# Patient Record
Sex: FEMALE | Race: BLACK OR AFRICAN AMERICAN
[De-identification: names, ages, dates, MRNs, and addresses within clinical notes are randomized per-mention and may not be internally consistent; named-entity substitution may affect disease eponyms.]

---

## 2017-06-10 ENCOUNTER — HEALTH MAINTENANCE LETTER (OUTPATIENT)
Age: 62
End: 2017-06-10

## 2018-07-30 ENCOUNTER — TRANSFERRED RECORDS (OUTPATIENT)
Dept: HEALTH INFORMATION MANAGEMENT | Facility: CLINIC | Age: 63
End: 2018-07-30

## 2019-12-09 ENCOUNTER — HEALTH MAINTENANCE LETTER (OUTPATIENT)
Age: 64
End: 2019-12-09

## 2020-03-15 ENCOUNTER — HEALTH MAINTENANCE LETTER (OUTPATIENT)
Age: 65
End: 2020-03-15

## 2021-01-15 ENCOUNTER — HEALTH MAINTENANCE LETTER (OUTPATIENT)
Age: 66
End: 2021-01-15

## 2021-03-14 ENCOUNTER — HEALTH MAINTENANCE LETTER (OUTPATIENT)
Age: 66
End: 2021-03-14

## 2021-10-24 ENCOUNTER — HEALTH MAINTENANCE LETTER (OUTPATIENT)
Age: 66
End: 2021-10-24

## 2022-04-10 ENCOUNTER — HEALTH MAINTENANCE LETTER (OUTPATIENT)
Age: 67
End: 2022-04-10

## 2022-10-15 ENCOUNTER — HEALTH MAINTENANCE LETTER (OUTPATIENT)
Age: 67
End: 2022-10-15

## 2023-06-01 ENCOUNTER — HEALTH MAINTENANCE LETTER (OUTPATIENT)
Age: 68
End: 2023-06-01

## 2024-12-21 ENCOUNTER — HEALTH MAINTENANCE LETTER (OUTPATIENT)
Age: 69
End: 2024-12-21

## 2024-12-23 ENCOUNTER — OFFICE VISIT (OUTPATIENT)
Dept: OPHTHALMOLOGY | Facility: CLINIC | Age: 69
End: 2024-12-23
Payer: COMMERCIAL

## 2024-12-23 DIAGNOSIS — H40.1131 PRIMARY OPEN ANGLE GLAUCOMA (POAG) OF BOTH EYES, MILD STAGE: Primary | ICD-10-CM

## 2024-12-23 RX ORDER — LATANOPROST 50 UG/ML
1 SOLUTION/ DROPS OPHTHALMIC AT BEDTIME
COMMUNITY
End: 2024-12-23

## 2024-12-23 RX ORDER — LATANOPROST 50 UG/ML
1 SOLUTION/ DROPS OPHTHALMIC AT BEDTIME
Qty: 7.5 ML | Refills: 3 | Status: SHIPPED | OUTPATIENT
Start: 2024-12-23

## 2024-12-23 ASSESSMENT — REFRACTION_WEARINGRX
OD_CYLINDER: +1.00
OD_ADD: +2.85
OS_SPHERE: -4.50
OD_SPHERE: -5.25
OS_AXIS: 110
OS_CYLINDER: +0.50
OS_ADD: +2.85
SPECS_TYPE: PAL
OD_AXIS: 014

## 2024-12-23 ASSESSMENT — TONOMETRY
OD_IOP_MMHG: 20
OS_IOP_MMHG: 19
IOP_METHOD: APPLANATION

## 2024-12-23 ASSESSMENT — VISUAL ACUITY
OD_CC: 20/20
CORRECTION_TYPE: GLASSES
OD_CC+: -2
OS_CC+: -1
METHOD: SNELLEN - LINEAR
OS_CC: 20/20

## 2024-12-23 ASSESSMENT — SLIT LAMP EXAM - LIDS
COMMENTS: 1+ DERMATOCHALASIS, 1+ MEIBOMIAN GLAND DYSFUNCTION
COMMENTS: 1+ DERMATOCHALASIS, 1+ MEIBOMIAN GLAND DYSFUNCTION

## 2024-12-23 ASSESSMENT — EXTERNAL EXAM - RIGHT EYE: OD_EXAM: PROLAPSED FAT PADS: UPPER, LOWER

## 2024-12-23 ASSESSMENT — EXTERNAL EXAM - LEFT EYE: OS_EXAM: PROLAPSED FAT PADS: UPPER, LOWER

## 2024-12-23 NOTE — PATIENT INSTRUCTIONS
"Continue:   Latanoprost (green top) every evening both eyes.    May use artificial tears up to four times a day (like Refresh Optive, Systane Balance, or TheraTears. Avoid \"get the red out\" drops and generic artifical tears).     Wait at least 5 minutes between drops if using more than one at a time.     Return visit 6 months for a complete exam and a pachy.    Robin Bejarano M.D.  [phone]    "

## 2024-12-23 NOTE — LETTER
"12/23/2024      Shelby Munoz  [address] [place] [facility] [address]      Dear Colleague,    Thank you for referring your patient, Shelby Munoz, to the [facility]. Please see a copy of my visit note below.     Current Eye Medications:  Latanoprost at bedtime both eyes     Subjective:  Looking for a new doctor as they told her they lost her records at [facility].  She is here for glaucoma follow up per patient as she was diagnosed with glaucoma about three years ago.  Her last complete eye exam was in May of 2024.  Has had laser in both eyes for Pressure release at [facility].  Needs refills sent.    Diabetes     Had presumed selective laser trabeculoplasty both eyes.      Objective:  See Ophthalmology Exam.       Assessment:  Baseline glaucoma testing in patient with abnormal testing right eye and arturo testing left eye.  Intraocular pressure high normal both eyes.      Plan:  Continue:   Latanoprost (green top) every evening both eyes.    May use artificial tears up to four times a day (like Refresh Optive, Systane Balance, or TheraTears. Avoid \"get the red out\" drops and generic artifical tears).     Wait at least 5 minutes between drops if using more than one at a time.     Return visit 6 months for a complete exam and a pachy.    Robin Bejarano M.D.  [phone]            Again, thank you for allowing me to participate in the care of your patient.        Sincerely,        Robin Bejarano MD    Electronically signed"

## 2024-12-23 NOTE — PROGRESS NOTES
" Current Eye Medications:  Latanoprost at bedtime both eyes     Subjective:  Looking for a new doctor as they told her they lost her records at [facility].  She is here for glaucoma follow up per patient as she was diagnosed with glaucoma about three years ago.  Her last complete eye exam was in May of 2024.  Has had laser in both eyes for Pressure release at [facility].  Needs refills sent.    Diabetes     Had presumed selective laser trabeculoplasty both eyes.      Objective:  See Ophthalmology Exam.       Assessment:  Baseline glaucoma testing in patient with abnormal testing right eye and arturo testing left eye.  Intraocular pressure high normal both eyes.      Plan:  Continue:   Latanoprost (green top) every evening both eyes.    May use artificial tears up to four times a day (like Refresh Optive, Systane Balance, or TheraTears. Avoid \"get the red out\" drops and generic artifical tears).     Wait at least 5 minutes between drops if using more than one at a time.     Return visit 6 months for a complete exam and a pachy.    Robin Bejarano M.D.  [phone]        "

## 2024-12-24 PROBLEM — H40.1131 PRIMARY OPEN ANGLE GLAUCOMA (POAG) OF BOTH EYES, MILD STAGE: Status: ACTIVE | Noted: 2024-12-24

## 2025-03-28 PROBLEM — E66.01 CLASS 2 SEVERE OBESITY WITH BODY MASS INDEX (BMI) OF 35 TO 39.9 WITH SERIOUS COMORBIDITY (H): Status: ACTIVE | Noted: 2025-03-28

## 2025-03-28 PROBLEM — E66.812 CLASS 2 SEVERE OBESITY WITH BODY MASS INDEX (BMI) OF 35 TO 39.9 WITH SERIOUS COMORBIDITY (H): Status: ACTIVE | Noted: 2025-03-28

## 2025-03-31 ENCOUNTER — PATIENT OUTREACH (OUTPATIENT)
Dept: CARE COORDINATION | Facility: CLINIC | Age: 70
End: 2025-03-31
Payer: MEDICARE

## 2025-04-01 ENCOUNTER — LAB (OUTPATIENT)
Dept: LAB | Facility: CLINIC | Age: 70
End: 2025-04-01
Payer: COMMERCIAL

## 2025-04-01 DIAGNOSIS — I10 HYPERTENSION GOAL BP (BLOOD PRESSURE) < 140/90: ICD-10-CM

## 2025-04-01 DIAGNOSIS — E11.9 TYPE 2 DIABETES MELLITUS WITHOUT COMPLICATION, WITHOUT LONG-TERM CURRENT USE OF INSULIN (H): ICD-10-CM

## 2025-04-01 DIAGNOSIS — E78.5 HYPERLIPIDEMIA LDL GOAL <160: ICD-10-CM

## 2025-04-01 LAB
ANION GAP SERPL CALCULATED.3IONS-SCNC: 11 MMOL/L (ref 7–15)
BUN SERPL-MCNC: 16.5 MG/DL (ref 8–23)
CALCIUM SERPL-MCNC: 9.8 MG/DL (ref 8.8–10.4)
CHLORIDE SERPL-SCNC: 103 MMOL/L (ref 98–107)
CHOLEST SERPL-MCNC: 201 MG/DL
CREAT SERPL-MCNC: 0.97 MG/DL (ref 0.51–0.95)
CREAT UR-MCNC: 176 MG/DL
EGFRCR SERPLBLD CKD-EPI 2021: 63 ML/MIN/1.73M2
EST. AVERAGE GLUCOSE BLD GHB EST-MCNC: 114 MG/DL
FASTING STATUS PATIENT QL REPORTED: YES
FASTING STATUS PATIENT QL REPORTED: YES
GLUCOSE SERPL-MCNC: 90 MG/DL (ref 70–99)
HBA1C MFR BLD: 5.6 % (ref 0–5.6)
HCO3 SERPL-SCNC: 26 MMOL/L (ref 22–29)
HDLC SERPL-MCNC: 49 MG/DL
LDLC SERPL CALC-MCNC: 129 MG/DL
MICROALBUMIN UR-MCNC: <12 MG/L
MICROALBUMIN/CREAT UR: NORMAL MG/G{CREAT}
NONHDLC SERPL-MCNC: 152 MG/DL
POTASSIUM SERPL-SCNC: 3.5 MMOL/L (ref 3.4–5.3)
SODIUM SERPL-SCNC: 140 MMOL/L (ref 135–145)
TRIGL SERPL-MCNC: 114 MG/DL

## 2025-04-01 PROCEDURE — 80048 BASIC METABOLIC PNL TOTAL CA: CPT

## 2025-04-01 PROCEDURE — 82043 UR ALBUMIN QUANTITATIVE: CPT

## 2025-04-01 PROCEDURE — 80061 LIPID PANEL: CPT

## 2025-04-01 PROCEDURE — 36415 COLL VENOUS BLD VENIPUNCTURE: CPT

## 2025-04-01 PROCEDURE — 82570 ASSAY OF URINE CREATININE: CPT

## 2025-04-01 PROCEDURE — 83036 HEMOGLOBIN GLYCOSYLATED A1C: CPT

## 2025-04-29 ENCOUNTER — TELEPHONE (OUTPATIENT)
Dept: FAMILY MEDICINE | Facility: CLINIC | Age: 70
End: 2025-04-29
Payer: MEDICARE

## 2025-04-29 NOTE — TELEPHONE ENCOUNTER
Patient Quality Outreach    Patient is due for the following:   Diabetes -  Foot Exam      Topic Date Due    COVID-19 Vaccine (8 - 2024-25 season) 03/05/2025       Action(s) Taken:   Schedule a office visit for diabetes    Type of outreach:    Sent [email or web address] message.    Questions for provider review:    None         Libra Jane  Chart routed to .

## 2025-05-16 ENCOUNTER — ANCILLARY PROCEDURE (OUTPATIENT)
Dept: MAMMOGRAPHY | Facility: CLINIC | Age: 70
End: 2025-05-16
Attending: INTERNAL MEDICINE
Payer: MEDICARE

## 2025-05-16 ENCOUNTER — ANCILLARY PROCEDURE (OUTPATIENT)
Dept: BONE DENSITY | Facility: CLINIC | Age: 70
End: 2025-05-16
Attending: INTERNAL MEDICINE
Payer: MEDICARE

## 2025-05-16 ENCOUNTER — RESULTS FOLLOW-UP (OUTPATIENT)
Dept: FAMILY MEDICINE | Facility: CLINIC | Age: 70
End: 2025-05-16

## 2025-05-16 DIAGNOSIS — Z12.31 ENCOUNTER FOR SCREENING MAMMOGRAM FOR BREAST CANCER: ICD-10-CM

## 2025-05-16 DIAGNOSIS — E28.39 ESTROGEN DEFICIENCY: ICD-10-CM

## 2025-05-16 PROCEDURE — 77067 SCR MAMMO BI INCL CAD: CPT | Performed by: RADIOLOGY

## 2025-05-16 PROCEDURE — 77080 DXA BONE DENSITY AXIAL: CPT | Performed by: RADIOLOGY

## 2025-05-16 PROCEDURE — 77063 BREAST TOMOSYNTHESIS BI: CPT | Performed by: RADIOLOGY

## 2025-05-16 PROCEDURE — 77081 DXA BONE DENSITY APPENDICULR: CPT | Mod: XU | Performed by: RADIOLOGY

## 2025-05-19 ENCOUNTER — TELEPHONE (OUTPATIENT)
Dept: OPHTHALMOLOGY | Facility: CLINIC | Age: 70
End: 2025-05-19
Payer: MEDICARE

## 2025-05-19 NOTE — TELEPHONE ENCOUNTER
Tried calling patient back, no answer. It did not give a beep to leave a message either. Will message patient via [employer]. Not positive what she is trying to buy for her eyes.

## 2025-05-19 NOTE — TELEPHONE ENCOUNTER
[facility] Call Center    Phone Message    May a detailed message be left on voicemail: yes     Reason for Call: Other: Shelby called because she wanted to know if it would be okay for her to purchase a eye massage machine with head at cold settings. Please call Shelby back at [phone]. Thank you      Action Taken: Other: [facility]     Travel Screening: Not Applicable     Date of Service:

## 2025-05-24 DIAGNOSIS — H68.102 BLOCKED EUSTACHIAN TUBE, LEFT: ICD-10-CM

## 2025-05-27 RX ORDER — FLUTICASONE PROPIONATE 50 MCG
1 SPRAY, SUSPENSION (ML) NASAL DAILY
Qty: 16 G | Refills: 1 | Status: SHIPPED | OUTPATIENT
Start: 2025-05-27

## 2025-06-23 DIAGNOSIS — E78.5 HYPERLIPIDEMIA LDL GOAL <160: ICD-10-CM

## 2025-06-23 RX ORDER — PRAVASTATIN SODIUM 10 MG
10 TABLET ORAL DAILY
Qty: 90 TABLET | Refills: 2 | Status: SHIPPED | OUTPATIENT
Start: 2025-06-23

## 2025-06-27 ENCOUNTER — OFFICE VISIT (OUTPATIENT)
Dept: OPHTHALMOLOGY | Facility: CLINIC | Age: 70
End: 2025-06-27
Payer: MEDICARE

## 2025-06-27 DIAGNOSIS — H40.1131 PRIMARY OPEN ANGLE GLAUCOMA (POAG) OF BOTH EYES, MILD STAGE: ICD-10-CM

## 2025-06-27 DIAGNOSIS — E11.9 TYPE 2 DIABETES MELLITUS WITHOUT COMPLICATION, WITHOUT LONG-TERM CURRENT USE OF INSULIN (H): Primary | ICD-10-CM

## 2025-06-27 DIAGNOSIS — H52.4 PRESBYOPIA: ICD-10-CM

## 2025-06-27 DIAGNOSIS — H43.813 POSTERIOR VITREOUS DETACHMENT OF BOTH EYES: ICD-10-CM

## 2025-06-27 PROBLEM — H40.9 GLAUCOMA, RIGHT EYE: Status: ACTIVE | Noted: 2024-03-04

## 2025-06-27 PROBLEM — D12.3 BENIGN NEOPLASM OF TRANSVERSE COLON: Status: ACTIVE | Noted: 2021-11-21

## 2025-06-27 PROBLEM — H40.9 GLAUCOMA: Status: ACTIVE | Noted: 2024-03-02

## 2025-06-27 PROBLEM — H04.123 DRY EYES: Status: ACTIVE | Noted: 2024-09-27

## 2025-06-27 PROBLEM — K63.5 POLYP OF COLON: Status: ACTIVE | Noted: 2021-11-19

## 2025-06-27 PROBLEM — D12.8 BENIGN NEOPLASM OF RECTUM: Status: ACTIVE | Noted: 2021-11-23

## 2025-06-27 PROCEDURE — 92015 DETERMINE REFRACTIVE STATE: CPT | Mod: GY | Performed by: OPHTHALMOLOGY

## 2025-06-27 PROCEDURE — 92014 COMPRE OPH EXAM EST PT 1/>: CPT | Performed by: OPHTHALMOLOGY

## 2025-06-27 ASSESSMENT — REFRACTION_MANIFEST
OS_CYLINDER: +0.50
OD_AXIS: 013
OD_CYLINDER: +0.75
OS_SPHERE: -4.25
OD_ADD: +2.75
OS_ADD: +2.75
OD_SPHERE: -5.25
OS_AXIS: 133

## 2025-06-27 ASSESSMENT — TONOMETRY
OD_IOP_MMHG: 14
OS_IOP_MMHG: 13
IOP_METHOD: APPLANATION

## 2025-06-27 ASSESSMENT — EXTERNAL EXAM - LEFT EYE: OS_EXAM: PROLAPSED FAT PADS: UPPER, LOWER

## 2025-06-27 ASSESSMENT — REFRACTION_WEARINGRX
OD_SPHERE: -5.00
OS_ADD: +2.85
OS_CYLINDER: +0.50
OD_AXIS: 014
OD_CYLINDER: +0.75
OS_SPHERE: -4.25
OD_ADD: +2.85
OS_AXIS: 107

## 2025-06-27 ASSESSMENT — VISUAL ACUITY
CORRECTION_TYPE: GLASSES
OD_CC+: +2
OD_CC: 20/25
OS_CC: J1
OS_CC: 20/20
METHOD: SNELLEN - LINEAR
OD_CC: J1+

## 2025-06-27 ASSESSMENT — PACHYMETRY
OS_CT(UM): .512
OD_CT(UM): .534

## 2025-06-27 ASSESSMENT — CONF VISUAL FIELD
OS_SUPERIOR_NASAL_RESTRICTION: 0
OS_SUPERIOR_TEMPORAL_RESTRICTION: 0
OS_NORMAL: 1
OS_INFERIOR_TEMPORAL_RESTRICTION: 0
OS_INFERIOR_NASAL_RESTRICTION: 0

## 2025-06-27 ASSESSMENT — EXTERNAL EXAM - RIGHT EYE: OD_EXAM: PROLAPSED FAT PADS: UPPER, LOWER

## 2025-06-27 ASSESSMENT — CUP TO DISC RATIO
OS_RATIO: 0.3
OD_RATIO: 0.4

## 2025-06-27 NOTE — PROGRESS NOTES
" Current Eye Medications:  Latanoprost both eyes every evening.  Last drops:  10pm.    Artificial tears both eyes as needed.      Subjective:  Patient is here for a Diabetic Eye Exam.   She is aware of the visual field loss in her right eye.  She occasionally has subconjunctival hemorrhages in her eyes.  Overall, vision is good with correction     Lab Results   Component Value Date    A1C 5.6 04/01/2025    A1C 6.3 01/25/2011        Objective:  See Ophthalmology Exam.       Assessment:  Intraocular pressures better today.  Superior disc rim right eye looks intact today.  Corneas thin on pachy.  Need lower pressures?  No diabetic retinopathy.      ICD-10-CM    1. Type 2 diabetes mellitus without complication, without long-term current use of insulin (H)  E11.9       2. Primary open angle glaucoma (POAG) of both eyes, mild stage  H40.1131 PACHYMETRY      3. Posterior vitreous detachment of both eyes  H43.813       4. Presbyopia  H52.4 REFRACTIVE STATUS           Plan:  Continue:   Latanoprost (green top) every evening both eyes.    May use artificial tears up to four times a day (like Refresh Optive, Systane Balance, or TheraTears. Avoid \"get the red out\" drops and generic artifical tears).     Wait at least 5 minutes between drops if using more than one at a time.     Glasses prescription given - optional    Return visit 6 months for an intraocular pressure check, glaucoma OCT, retinal OCT, and Murillo Visual Field.     Robin Bejarano M.D.  [phone]       "

## 2025-06-27 NOTE — LETTER
"6/27/2025      Shelby Munoz  [address] [place] [address]      Dear Colleague,    Thank you for referring your patient, Shelby Munoz, to the [facility]. Please see a copy of my visit note below.     Current Eye Medications:  Latanoprost both eyes every evening.  Last drops:  10pm.    Artificial tears both eyes as needed.      Subjective:  Patient is here for a Diabetic Eye Exam.   She is aware of the visual field loss in her right eye.  She occasionally has subconjunctival hemorrhages in her eyes.  Overall, vision is good with correction     Lab Results   Component Value Date    A1C 5.6 04/01/2025    A1C 6.3 01/25/2011        Objective:  See Ophthalmology Exam.       Assessment:  Intraocular pressures better today.  Superior disc rim right eye looks intact today.  Corneas thin on pachy.  Need lower pressures?  No diabetic retinopathy.      ICD-10-CM    1. Type 2 diabetes mellitus without complication, without long-term current use of insulin (H)  E11.9       2. Primary open angle glaucoma (POAG) of both eyes, mild stage  H40.1131 PACHYMETRY      3. Posterior vitreous detachment of both eyes  H43.813       4. Presbyopia  H52.4 REFRACTIVE STATUS           Plan:  Continue:   Latanoprost (green top) every evening both eyes.    May use artificial tears up to four times a day (like Refresh Optive, Systane Balance, or TheraTears. Avoid \"get the red out\" drops and generic artifical tears).     Wait at least 5 minutes between drops if using more than one at a time.     Glasses prescription given - optional    Return visit 6 months for an intraocular pressure check, glaucoma OCT, retinal OCT, and Murillo Visual Field.     Robin Bejarano M.D.  [phone]         Again, thank you for allowing me to participate in the care of your patient.        Sincerely,        Robin Bejarano MD    Electronically signed"

## 2025-06-27 NOTE — PATIENT INSTRUCTIONS
"Continue:   Latanoprost (green top) every evening both eyes.    May use artificial tears up to four times a day (like Refresh Optive, Systane Balance, or TheraTears. Avoid \"get the red out\" drops and generic artifical tears).     Wait at least 5 minutes between drops if using more than one at a time.     Glasses prescription given - optional    Return visit 6 months for an intraocular pressure check, glaucoma OCT, retinal OCT, and Murillo Visual Field.     Robin Bejarano M.D.  [phone]      Patient Education   Diabetes weakens the blood vessels all over the body, including the eyes. Damage to the blood vessels in the eyes can cause swelling or bleeding into part of the eye (called the retina). This is called diabetic retinopathy (ULICES-tin--[facility]-thee). If not treated, this disease can cause vision loss or blindness.   Symptoms may include blurred or distorted vision, but many people have no symptoms. It's important to see your eye doctor regularly to check for problems.   Early treatment and good control can help protect your vision. Here are the things you can do to help prevent vision loss:      1. Keep your blood sugar levels under tight control.      2. Bring high blood pressure under control.      3. No smoking.      4. Have yearly dilated eye exams.     "

## 2025-06-29 PROBLEM — H43.813 POSTERIOR VITREOUS DETACHMENT OF BOTH EYES: Status: ACTIVE | Noted: 2025-06-29

## 2025-07-05 ENCOUNTER — HEALTH MAINTENANCE LETTER (OUTPATIENT)
Age: 70
End: 2025-07-05